# Patient Record
Sex: MALE | Race: WHITE | URBAN - METROPOLITAN AREA
[De-identification: names, ages, dates, MRNs, and addresses within clinical notes are randomized per-mention and may not be internally consistent; named-entity substitution may affect disease eponyms.]

---

## 2024-02-06 ENCOUNTER — TELEPHONE (OUTPATIENT)
Dept: PRIMARY CARE | Facility: CLINIC | Age: 64
End: 2024-02-06
Payer: COMMERCIAL

## 2024-02-06 NOTE — TELEPHONE ENCOUNTER
ATORVASTATIN 20 MG  ZETIA 10 MG  PERCOCET (GIVEN IN HOSP) 28 WERE PRESCRIBED 2/2/24  CAN HE CONT STOOL SOFTNER?  METROPOLOL IS ON MED LIST BUT NOT GIVEN AT HOSP?    HAS VIRTUAL 2/9/24    [facility]

## 2024-02-07 NOTE — TELEPHONE ENCOUNTER
Pt will call back to schedule an appointment. Needs to be seen by doctor harvey in the office or virtually for refill on a controlled substance

## 2024-02-09 ENCOUNTER — OFFICE VISIT (OUTPATIENT)
Dept: PRIMARY CARE | Facility: CLINIC | Age: 64
End: 2024-02-09
Payer: COMMERCIAL

## 2024-02-09 VITALS
OXYGEN SATURATION: 94 % | HEART RATE: 84 BPM | BODY MASS INDEX: 36.71 KG/M2 | WEIGHT: 277 LBS | HEIGHT: 73 IN | SYSTOLIC BLOOD PRESSURE: 98 MMHG | DIASTOLIC BLOOD PRESSURE: 57 MMHG

## 2024-02-09 DIAGNOSIS — E03.9 HYPOTHYROIDISM, UNSPECIFIED TYPE: ICD-10-CM

## 2024-02-09 DIAGNOSIS — T14.8XXA WOUND INFECTION: ICD-10-CM

## 2024-02-09 DIAGNOSIS — I43 CARDIOMYOPATHY, HYPERTENSIVE, BENIGN, WITHOUT HEART FAILURE (MULTI): ICD-10-CM

## 2024-02-09 DIAGNOSIS — Z98.890 S/P VASCULAR SURGERY: ICD-10-CM

## 2024-02-09 DIAGNOSIS — I11.9 CARDIOMYOPATHY, HYPERTENSIVE, BENIGN, WITHOUT HEART FAILURE (MULTI): ICD-10-CM

## 2024-02-09 DIAGNOSIS — I25.118 CORONARY ARTERY DISEASE INVOLVING NATIVE HEART WITH OTHER FORM OF ANGINA PECTORIS, UNSPECIFIED VESSEL OR LESION TYPE (CMS-HCC): ICD-10-CM

## 2024-02-09 DIAGNOSIS — Z00.00 ROUTINE GENERAL MEDICAL EXAMINATION AT A HEALTH CARE FACILITY: ICD-10-CM

## 2024-02-09 DIAGNOSIS — E78.5 HYPERLIPIDEMIA, UNSPECIFIED HYPERLIPIDEMIA TYPE: ICD-10-CM

## 2024-02-09 DIAGNOSIS — Z76.89 ENCOUNTER FOR SUPPORT AND COORDINATION OF TRANSITION OF CARE: Primary | ICD-10-CM

## 2024-02-09 DIAGNOSIS — L08.9 WOUND INFECTION: ICD-10-CM

## 2024-02-09 DIAGNOSIS — I15.9 SECONDARY HYPERTENSION: ICD-10-CM

## 2024-02-09 DIAGNOSIS — M79.A29 NON-TRAUMATIC COMPARTMENT SYNDROME OF LOWER EXTREMITY, UNSPECIFIED LATERALITY: ICD-10-CM

## 2024-02-09 PROCEDURE — 99496 TRANSJ CARE MGMT HIGH F2F 7D: CPT | Performed by: EMERGENCY MEDICINE

## 2024-02-09 PROCEDURE — 1036F TOBACCO NON-USER: CPT | Performed by: EMERGENCY MEDICINE

## 2024-02-09 RX ORDER — APIXABAN 5 MG/1
5 TABLET, FILM COATED ORAL 2 TIMES DAILY
COMMUNITY
Start: 2024-02-02

## 2024-02-09 RX ORDER — ATORVASTATIN CALCIUM 20 MG/1
20 TABLET, FILM COATED ORAL DAILY
Qty: 90 TABLET | Refills: 1 | Status: SHIPPED | OUTPATIENT
Start: 2024-02-09

## 2024-02-09 RX ORDER — DOCUSATE SODIUM 100 MG/1
100 CAPSULE, LIQUID FILLED ORAL 2 TIMES DAILY
COMMUNITY
Start: 2024-02-02

## 2024-02-09 RX ORDER — TRAZODONE HYDROCHLORIDE 100 MG/1
100 TABLET ORAL NIGHTLY
COMMUNITY
Start: 2024-02-02

## 2024-02-09 RX ORDER — ASPIRIN 81 MG/1
TABLET ORAL DAILY
COMMUNITY
Start: 2024-01-04

## 2024-02-09 RX ORDER — LANCETS 30 GAUGE
EACH MISCELLANEOUS
COMMUNITY
Start: 2024-02-03

## 2024-02-09 RX ORDER — LANCETS 33 GAUGE
EACH MISCELLANEOUS
COMMUNITY
Start: 2024-02-01

## 2024-02-09 RX ORDER — LEVOTHYROXINE SODIUM 100 UG/1
100 TABLET ORAL
COMMUNITY
Start: 2024-02-01

## 2024-02-09 RX ORDER — ATORVASTATIN CALCIUM 20 MG/1
TABLET, FILM COATED ORAL DAILY
COMMUNITY
Start: 2018-11-19 | End: 2024-02-09 | Stop reason: SDUPTHER

## 2024-02-09 RX ORDER — METFORMIN HYDROCHLORIDE 500 MG/1
500 TABLET ORAL
COMMUNITY
Start: 2024-01-16 | End: 2024-02-15

## 2024-02-09 RX ORDER — EZETIMIBE 10 MG/1
10 TABLET ORAL DAILY
COMMUNITY
Start: 2018-11-19 | End: 2024-02-09 | Stop reason: SDUPTHER

## 2024-02-09 RX ORDER — OXYCODONE AND ACETAMINOPHEN 5; 325 MG/1; MG/1
2 TABLET ORAL EVERY 6 HOURS PRN
COMMUNITY
End: 2024-02-09 | Stop reason: SDUPTHER

## 2024-02-09 RX ORDER — LOSARTAN POTASSIUM 100 MG/1
100 TABLET ORAL DAILY
COMMUNITY
Start: 2024-02-02 | End: 2024-02-09 | Stop reason: SDUPTHER

## 2024-02-09 RX ORDER — OXYCODONE AND ACETAMINOPHEN 5; 325 MG/1; MG/1
1 TABLET ORAL EVERY 8 HOURS PRN
Qty: 21 TABLET | Refills: 0 | Status: SHIPPED | OUTPATIENT
Start: 2024-02-09 | End: 2024-02-16

## 2024-02-09 RX ORDER — LOSARTAN POTASSIUM 100 MG/1
50 TABLET ORAL DAILY
Qty: 45 TABLET | Refills: 1 | Status: SHIPPED | OUTPATIENT
Start: 2024-02-09

## 2024-02-09 RX ORDER — AMLODIPINE BESYLATE 5 MG/1
5 TABLET ORAL
COMMUNITY
Start: 2024-02-02 | End: 2024-02-09 | Stop reason: WASHOUT

## 2024-02-09 RX ORDER — NAPROXEN SODIUM 220 MG/1
TABLET ORAL
COMMUNITY

## 2024-02-09 RX ORDER — COLLAGENASE SANTYL 250 [ARB'U]/G
OINTMENT TOPICAL
COMMUNITY
Start: 2024-02-02

## 2024-02-09 RX ORDER — BLOOD-GLUCOSE METER
EACH MISCELLANEOUS
COMMUNITY
Start: 2024-02-01

## 2024-02-09 RX ORDER — EZETIMIBE 10 MG/1
10 TABLET ORAL DAILY
Qty: 90 TABLET | Refills: 1 | Status: SHIPPED | OUTPATIENT
Start: 2024-02-09

## 2024-02-09 NOTE — PROGRESS NOTES
Subjective   Patient ID: Christopher Betancourt is a 63 y.o. male who presents for Hospital Follow-up.    Assessment/Plan   Problem List Items Addressed This Visit    None  Visit Diagnoses       S/P vascular surgery    -  Primary    Relevant Medications    oxyCODONE-acetaminophen (Percocet) 5-325 mg tablet        S/p repair of popliteal aneurysm-this was complicated by needing another procedure for wound debridement.  He developed compartment syndrome and needed procedure for that  Eventually was discharged home on wound VAC.  Is following with wound clinic and vascular surgery    Patient is stating that vascular surgery will not give him any more pain medication.  I will give him 1 week supply    Hypertension-patient's blood pressure is low today.  Will discontinue his Norvasc and cut down his dose of his losartan to 50.     Hyperlipidemia-on Lipitor and Zetia    Coronary artery disease-s/p intervention.  Follows up with cardiology    Was recently started on metformin.  Will check his A1c    Hypothyroidism-on Synthroid        Source of history: Nurse, Medical personnel, Medical record, Patient.  History limitation: None.    HPI  Patient was discharged from [facility] ([place], [place]) on February 5_ and there was interactive contact by patient/family or facility staff on behalf of patient with me/office within 2 working days regarding patient's transition    Patient was recently admitted to the hospital.  Patient's history regarding the reason for hospital admission and the course in the hospital was reviewed with patinet and/or family.  Patient's medical records including lab work, radiology and other medical notes were reviewed.  His medication list prior to admission and discharge medication list are compared and updated.    Patient encounter was done face-to-face    Patient is unable to give detailed history and therefore history is obtained from the chart    History from hospitalization-  Patient came to the ER for leg  "pain was found to have a popliteal aneurysm.  He underwent surgery to repair this.  This was complicated by wound infection needing debridement.  He also developed compartment syndrome requiring another procedure.  He had prolonged stay in the hospital.  He was discharged home with wound VAC  Currently follows up with vascular surgery and wound care    Allergies   Allergen Reactions    Latex Other       Current Outpatient Medications   Medication Sig Dispense Refill    aspirin 81 mg EC tablet once daily.      atorvastatin (Lipitor) 20 mg tablet once daily.      cholecalciferol, vitamin D3, (VITAMIN D3 ORAL) Take 50 mcg by mouth once daily.      collagenase (SantyL) 250 unit/gram ointment 1 merrill, Topical, DAILY, Refill(s) 0, Date: 2/2/24 2:26:00 PM EST      docusate sodium (Colace) 100 mg capsule 1 capsule (100 mg) 2 times a day.      Eliquis 5 mg tablet 1 tablet (5 mg) 2 times a day.      ezetimibe (Zetia) 10 mg tablet 1 tablet (10 mg) once daily.      levothyroxine (Synthroid, Levoxyl) 100 mcg tablet 1 tablet (100 mcg) once daily in the morning. Take before meals.      losartan (Cozaar) 100 mg tablet 1 tablet (100 mg) once daily.      melatonin (MELATIN ORAL) Take 5 mg by mouth.      metFORMIN (Glucophage) 500 mg tablet 1 tablet (500 mg) once daily with breakfast.      Norvasc 5 mg tablet 1 tablet (5 mg).      omega 3-dha-epa-fish oil (Fish OiL) 1,200 (144-216) mg capsule Take by mouth.      OneTouch Delica Plus Lancet 33 gauge misc Check daily      OneTouch Verio Flex meter misc Check daily      OneTouch Verio test strips strip Check daily      traZODone (Desyrel) 100 mg tablet 1 tablet (100 mg) once daily at bedtime.      oxyCODONE-acetaminophen (Percocet) 5-325 mg tablet Take 1 tablet by mouth every 8 hours if needed for severe pain (7 - 10) for up to 7 days. 21 tablet 0     No current facility-administered medications for this visit.       Objective   Visit Vitals  BP 98/57   Pulse 84   Ht 1.854 m (6' 1\")   Wt " 126 kg (277 lb)   SpO2 94%   BMI 36.55 kg/m²   Smoking Status Former   BSA 2.55 m²     Physical Exam  Vital signs as per nursing/MA documentation  General appearance: Alert and in no acute distress  HEENT: Normal Inspection  Neck - Normal Inspection  Respiratory : No respiratory distress. Lungs are clear   Cardiovascular: heart rate normal. No gallop  Back - normal inspection  Skin inspection:Warm  Musculoskeletal : No deformities  Neuro : Limited exam. Baseline    Review of Systems   Comprehensive review of systems as allowed by patient condition and nursing input is negative    Results including lab work, imaging reports were reviewed and wherever possible, independently verified    No family history on file.  Social History     Socioeconomic History    Marital status:      Spouse name: None    Number of children: None    Years of education: None    Highest education level: None   Occupational History    None   Tobacco Use    Smoking status: Former     Types: Cigarettes    Smokeless tobacco: Never   Substance and Sexual Activity    Alcohol use: Not Currently    Drug use: None    Sexual activity: None   Other Topics Concern    None   Social History Narrative    None     Social Determinants of Health     Financial Resource Strain: Not on file   Food Insecurity: Not on file   Transportation Needs: Not on file   Physical Activity: Not on file   Stress: Not on file   Social Connections: Not on file   Intimate Partner Violence: Not on file   Housing Stability: Not on file     History reviewed. No pertinent past medical history.  History reviewed. No pertinent surgical history.    Charting was completed using voice recognition technology and may include unintended errors.

## 2024-02-14 ENCOUNTER — TELEPHONE (OUTPATIENT)
Dept: PRIMARY CARE | Facility: CLINIC | Age: 64
End: 2024-02-14
Payer: COMMERCIAL

## 2024-02-14 DIAGNOSIS — I15.9 SECONDARY HYPERTENSION: ICD-10-CM

## 2024-02-14 RX ORDER — LOSARTAN POTASSIUM 50 MG/1
50 TABLET ORAL DAILY
Qty: 90 TABLET | Refills: 1 | Status: SHIPPED | OUTPATIENT
Start: 2024-02-14 | End: 2025-02-13

## 2024-02-14 NOTE — TELEPHONE ENCOUNTER
PATIENT WAS TOLD AT VISIT ON 2/9/24 THAT LOSARTIN WOULD BBE DECREASED TO 50 MG 1 DAILY    HAIR RAMIREZ RD

## 2024-03-05 ENCOUNTER — APPOINTMENT (OUTPATIENT)
Dept: ORTHOPEDIC SURGERY | Facility: HOSPITAL | Age: 64
End: 2024-03-05
Payer: COMMERCIAL

## 2024-12-20 LAB
NON-UH HIE CALCULATED LDL CHOLESTEROL: 67 MG/DL (ref 60–130)
NON-UH HIE CHOLESTEROL: 132 MG/DL (ref 100–200)
NON-UH HIE HDL CHOLESTEROL: 45 MG/DL (ref 40–60)
NON-UH HIE TOTAL CHOL/HDL CHOL RATIO: 2.9
NON-UH HIE TRIGLYCERIDES: 100 MG/DL (ref 30–150)

## 2025-03-07 LAB
NON-UH HIE A/G RATIO: 1
NON-UH HIE ALB: 4 G/DL (ref 3.4–5)
NON-UH HIE ALK PHOS: 77 UNIT/L (ref 45–117)
NON-UH HIE BILIRUBIN, TOTAL: 0.6 MG/DL (ref 0.3–1.2)
NON-UH HIE BUN/CREAT RATIO: 18.8
NON-UH HIE BUN: 15 MG/DL (ref 9–23)
NON-UH HIE CALCIUM: 9.8 MG/DL (ref 8.7–10.4)
NON-UH HIE CALCULATED OSMOLALITY: 282 MOSM/KG (ref 275–295)
NON-UH HIE CHLORIDE: 103 MMOL/L (ref 98–107)
NON-UH HIE CO2, VENOUS: 27 MMOL/L (ref 20–31)
NON-UH HIE CREATININE, URINE MG/DL: 117.7 MG/DL
NON-UH HIE CREATININE: 0.8 MG/DL (ref 0.6–1.1)
NON-UH HIE FREE T4: 1.49 NG/DL (ref 0.89–1.76)
NON-UH HIE GFR AA: >60
NON-UH HIE GLOBULIN: 3.8 G/DL
NON-UH HIE GLOMERULAR FILTRATION RATE: >60 ML/MIN/1.73M?
NON-UH HIE GLUCOSE: 94 MG/DL (ref 74–106)
NON-UH HIE GOT: 16 UNIT/L (ref 15–37)
NON-UH HIE GPT: 12 UNIT/L (ref 10–49)
NON-UH HIE HGB A1C: 5.4 %
NON-UH HIE K: 4.7 MMOL/L (ref 3.5–5.1)
NON-UH HIE MICROALBUMIN, URINE MG/L: 23 MG/L
NON-UH HIE MICROALBUMIN/CREATININE RATIO: 20 MG MALB/GM CREAT (ref 0–30)
NON-UH HIE NA: 141 MMOL/L (ref 135–145)
NON-UH HIE TOTAL PROTEIN: 7.8 G/DL (ref 5.7–8.2)
NON-UH HIE TSH: 5.43 UIU/ML (ref 0.55–4.78)

## 2025-07-02 LAB
NON-UH HIE CALCULATED LDL CHOLESTEROL: 59 MG/DL (ref 60–130)
NON-UH HIE CHOLESTEROL: 128 MG/DL (ref 100–200)
NON-UH HIE FREE T4: 1.68 NG/DL (ref 0.89–1.76)
NON-UH HIE HDL CHOLESTEROL: 44 MG/DL (ref 40–60)
NON-UH HIE HGB A1C: 5.3 %
NON-UH HIE TOTAL CHOL/HDL CHOL RATIO: 2.9
NON-UH HIE TRIGLYCERIDES: 123 MG/DL (ref 30–150)
NON-UH HIE TSH: 6.12 UIU/ML (ref 0.55–4.78)